# Patient Record
Sex: FEMALE | Race: WHITE | Employment: UNEMPLOYED | URBAN - METROPOLITAN AREA
[De-identification: names, ages, dates, MRNs, and addresses within clinical notes are randomized per-mention and may not be internally consistent; named-entity substitution may affect disease eponyms.]

---
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
## 2019-03-26 NOTE — PROGRESS NOTES
Consultation for hypertension and hypokalemia  Primary Care Physician: Donaldo Hines  Referring Provider: Referred Self    History of Present Illness:  The patient is an 17-year-old  female with reported history of atopic dermatitis, asthma, and recent history of rash, hypertension, hypokalemia, elevated creatinine, elevated aldosterone that presents to nephrology clinic for evaluation.      Her evaluation started November 20, 2017 when she presented to urgent care for a rash.  The rash was located on her upper chest for 3 days.  She she does not recall any new exposures including soaps, detergents, or clothing.  The rash did not improve with Benadryl.  The rash was not itchy or painful.  No fevers, chills, weight loss.  No joint pain.  At that visit her blood pressure was elevated to 152/97.  The rash was described as an erythematous papular to pustular lesion scattered across upper back, mild and less so in front of chest within cleavage area.  The patient was diagnosed with folliculitis and started on mupirocin ointment.     The patient went to see an oral surgeon on 12/6/2017 and was again found to have a high blood pressure up to 150 at that time.  The patient saw her primary care physician Dr. Hines that same day. She was also advised to stop birth control due to the hypertension.  She was taking Norgestimate-Eth Estradiol 0.18/0.215/0.25 mg-25 mcg daily for the past 18-20 months.  Routine lab work including CBC, urinalysis, complete metabolic profile, lipid profile, TSH were sent the complete metabolic profile was notable for a potassium of 3.3 and a bicarbonate of 36 and aldosterone level was sent at this time and was undetectable.  In the clinic the patient did have a blood pressure elevated to 155/115.  The other labs can be found in my progress note below.    The patient continued to measure her blood pressure at home which was elevated to 170/104 and presented to the emergency department for  further evaluation on 12 8.  The blood pressure was elevated in the ER to 156/102.  The patient was started on amlodipine 2.5 mg daily she had an EKG with an interpretation of normal.  3 days after the patient called the ER with ongoing blood pressure and the amlodipine was increased to 5 mg twice daily.  She was asked to follow-up in clinic.    The patient was then seen on 12/19/2017 with cardiology.  With the addition of amlodipine her blood pressure was in the 130-140s at home.  The blood pressure medication was continued and the patient was scheduled to get an echocardiogram in addition to her orderd pressure at home which was elevated to 170/104 and presented to the emergency department for further evaluation on 12 8.  The blood pressure was elevated in the ER to 156/102.  The patient was started on amlodipine 2.5 mg daily she had an EKG with an interpretation of normal.  3 days after the patient called the ER with ongoing blood pressure and the amlodipine was increased to 5 mg twice daily.  She was asked to follow-up in clinic.    The patient was then seen on 12/19/2017 with cardiology.  With the addition of amlodipine her blood pressure was in the 130-140s at home.    On 12/22/2017 she had a 24-hour urine collection as per nephrology which was significant only for an elevated aldosterone level at 31 mcg/24 hr.    The patient saw nephrology on 12/26/2017.  At the visit her blood pressure was 121/84.  Urine macro exam was notable for no blood . A direct microscopic exam of urine was performed with occasional renal tubular cells.  Follow-up in 1 week was done to evaluate 24 hour urine studies.  In the interim the patient did have her renal artery ultrasound and echocardiogram.  The renal artery ultrasound was notable for 3 arteries on the right and 2 arteries on the left with some loss of cortical medullary differentiation on the left.  There was no evidence for elevated velocities in any of the renal arteries  and the echocardiogram was normal.    The patient followed up with nephrology on 1/4/2018.  The blood pressure was 132/90 at the time.  A spot reading and was elevated and the high 24 hour urine Iglesia was also elevated.  Nephrology was concerned for fibromuscular dysplasia and a CTA was performed.  A renal angiogram was also discussed.    The patient has routine physicals as part of her annual exam or playing soccer.  Her last annual exam was 14 months prior to the evaluation of her hypertension.  She denies prior history of hypertension.  She does note that she gets occasional vague headaches.  She has a family history for migraine headaches.  The patient denies edema, NSAID use.  No flushing or palpitations.  She has regular periods.  She denies vision changes.    The patient is a senior in high school and wishes to pursue nursing.  She will be going to [employer] next year.  She is an avid .  She is accompanied in the clinic with her parents and friends.  Her mother is in school to become a nurse.    Since her last visit serologic testing for was positive with antimyeloperoxidase antibody.  Further inflammatory testing was negative.  She has no further issues or rash or new complaints.  Her blood pressure has been well controlled predominantly in the 120s systolic.  She did have one reading up to 148.  She is taking amlodipine 10 mg daily.  She did see IR and at that time her blood pressure was well controlled.  They did not feel that angiogram would be need to a lesion that was interveanable.      Active Medical Problems:  Patient Active Problem List   Diagnosis     Asthma     Atopic dermatitis     Hypokalemia     Hypertension       Past Surgical History:  Past Surgical History:   Procedure Laterality Date     HC REMOVE TONSILS/ADENOIDS,<10 Y/O  05/04       Family History:  Family History   Problem Relation Age of Onset     Asthma Mother      Hypertension Father      Lipids Father       Neurologic Disorder Maternal Grandmother      migraine headaches     Obesity Maternal Grandmother      gastric bypass surgery     CEREBROVASCULAR DISEASE Maternal Grandfather 32     Lipids Maternal Grandfather      Hypertension Maternal Grandfather      Congenital Anomalies Paternal Grandmother      Lupus     Hypertension Paternal Grandmother      Lipids Paternal Grandmother      HEART DISEASE Paternal Grandmother      CEREBROVASCULAR DISEASE Paternal Grandfather      Hypertension Paternal Grandfather      Lipids Paternal Grandfather      HEART DISEASE Paternal Grandfather      Aneurysm Maternal Aunt      Brain aneurysm       Social History:  Social History     Social History     Marital status: Single     Spouse name: N/A     Number of children: N/A     Years of education: N/A     Occupational History     Student      Social History Main Topics     Smoking status: Never Smoker     Smokeless tobacco: Never Used     Alcohol use No     Drug use: No     Sexual activity: No     Other Topics Concern     Not on file     Social History Narrative    The patient is a senior in high school.  She plays soccer and track.  She has smoked marijuana a couple of times, alcohol sometimes (4-5 times per year), 1 blackout.  No other drugs.  She is a vegetarian (she eats eggs but no milk or cheese).       Allergies:  Allergies   Allergen Reactions     Bee Venom Anaphylaxis     Cephalosporins      Cefzil       Medications:  Outpatient Encounter Prescriptions as of 3/9/2018   Medication Sig Dispense Refill     FERROUS GLUCONATE PO Take 27 mg by mouth       AmLODIPine Besylate (NORVASC PO) Take 5 mg by mouth 2 times daily       VITAMIN D, CHOLECALCIFEROL, PO Take 2,000 Units by mouth daily       calcium citrate-vitamin D (CITRACAL) 315-200 MG-UNIT TABS per tablet Take 1 tablet by mouth daily 630 mg daily 500 IU Vit D       multivitamin, therapeutic with minerals (MULTI-VITAMIN) TABS tablet Take 1 tablet by mouth daily       No  "facility-administered encounter medications on file as of 3/9/2018.         Review of Systems:   A comprehensive review of systems was obtained and negative, except as noted in the HPI or PMH.    Physical Exam:  /71  Pulse (P) 84  Temp (P) 98.2  F (36.8  C) (Oral)  Ht 1.654 m (5' 5.12\")  Wt 65.9 kg (145 lb 3.2 oz)  LMP 02/23/2018  SpO2 (P) 99%  BMI 24.07 kg/m2    GENERAL APPEARANCE: alert and no distress  HENT: mouth without ulcers or lesions  LYMPHATICS: no cervical or supraclavicular nodes  RESP: lungs clear to auscultation - no rales, rhonchi or wheezes  CV: regular rhythm, normal rate, no rub, no murmur  EDEMA: no LE edema bilaterally  ABDOMEN: soft, nondistended, nontender, bowel sounds normal, No bruit present.  MS: extremities normal - no gross deformities noted, no evidence of inflammation in joints, no muscle tenderness  SKIN: no rash    Laboratory:  12/6/2017  The white blood cell count was 6.1, hemoglobin 13.7, hematocrit 40.9, MCV 87.4, platelets 319, granulocytes 4.0, lymphocytes 1.7, the total monocyte eosinophil and basophil count was 0.4.  The urinalysis had a specific gravity of 1.010, pH 5.5, negative glucose, negative blood, negative protein, negative ketones, negative leuk esterase.  Sodium 138, potassium 3.3, chloride 101, bicarbonate 30, BUN 14, creatinine 0.68, albumin 3.5, total protein 7.5, bilirubin 0.6, AST 13, ALT 21, alk phos 71, cholesterol 186, HDL 64, LDL 95, triglycerides 137, TSH 1.72, beta hCG negative, aldosterone less than 4.    12/22/2017  24 hour urine collection, total volume 1250 mL, urine sodium 99, cortisol 20mcg, metanephrine 109, normetanephrine 386, total metanephrine 495, aldosterone 31.    12/26/2017  Urine pH 6.0, negative glucose, negative blood, negative bilirubin, negative ketones, negative leuk esterase, negative culture.    1/4/2017  Sodium 137, potassium 3.4, chloride 103, bicarb 26, BUN 9, creatinine 0.56, phosphorus 3.1, albumin 3.6, magnesium " 2.3, renin 9.2, aldosterone 14      Recent Labs   Lab Test  03/09/18   1133  02/02/18   1058   NA  137  141   POTASSIUM  3.2*  4.1   CHLORIDE  104  106   CO2  24  28   BUN  6*  8   CR  0.52  0.65   JOSIAH  8.9*  8.4*   PHOS  3.1  2.8   ALBUMIN  4.0  4.0   GLC  106*  89   WBC  8.5  4.5   HGB  13.9  14.3   MCV  88  88   PLT  216  218   PTHI   --   46   IRON   --   75   FEB   --   354   IRONSAT   --   21   AYANNA   --   26       Recent Labs   Lab Test  02/02/18   1105   COLOR  Yellow   APPEARANCE  Slightly Cloudy   URINEGLC  Negative   URINEBILI  Negative   URINEKETONE  Negative   SG  1.017   UBLD  Small*   URINEPH  9.0*   PROTEIN  30*   NITRITE  Negative   LEUKEST  Negative   RBCU  38*   WBCU  1   UTPG  0.11     ANCA antibody positive 1:320 (atypical p-ANCA).    2/12/2018  ALY screen - 42.49 units (<20)  Renin 9.6 ng/mL/h  Aldosterone serum, 11  Myeloperoxidase antibody 0.6 units   Proteinase 3 antibody <0.2 units  CRP < 2.5  CBC with differential - eos 0.1    Imaging:  All imaging studies or reports reviewed by me.     12/27/2017  Renal ultrasound with duplex  There are at least 3 right and 2 left renal arteries, velocities throughout all of which were expertly recorded by the ultrasound technologist and are recorded on her worksheet.  I agree with these velocities.  Notably, all are within normal limits.  No elevation of the renal arteries to aorta ratio involving any of the 5 arteries at any segment.  Additionally, the segmental artery acceleration indices were normal at all 3 poles bilaterally, as were the arcuate artery resistive indices at all 3 poles bilaterally.    The grayscale appearance of both kidneys is significance for some loss of corticomedullary differentiation somewhat worse on the right indicating some degree of chronic medical renal disease.  No hydronephrosis or mass lesion on either side.    Impression:  1.  There are 3 right and 2 left renal arteries were all patent throughout with no evidence for  renal artery stenosis involving any of the these vessels.  The acceleration indices, resistive indices, and spectral waveforms were also normal throughout.  No evidence for renal artery stenosis.  2.  There is some loss of corticomedullary differentiation, worse on the left, raising the possibility of underlying medical renal disease although this is a nonspecific and sometimes unreliable finding on ultrasound.    2/27/2017  Transthoracic echocardiogram  Final impression: The procedure performed was a complete 2D, spectral and CF Doppler echocardiogram.  The cardiac rhythm is sinus.  The left ventricle is normal in size.  There is no left ventricular wall thickness.  Left ventricular global, systolic function is normal.  Ejection fraction is greater than 55%.  No regional wall motion abnormalities noted.  There is mild mitral regurgitation.  The right atrial pressure is within normal range of less than 5 mm per mercury.  Diastolically appears to be normal.  This is essentially an unremarkable resting transthoracic echo.    1/11/2018  CTA abdomen and pelvis  Findings: Both kidneys are similar in size.  They enhance symmetrically.  There are 3 right and 2 left renal arteries.  All of these are widely patent.  No obvious fibromuscular dysplasia is present although its existence in 1 of the smaller accessory arteries would certainly be below the limits of resolution by CT.  The aorta and iliacs are widely patent.  The celiac axis, SMA and XIOMARA are all widely patent.  Lung bases clear.  Heart size is normal.  The liver, spleen, pancreas, and adrenal glands are within normal limits.  No visceral aneurysm is present.  No bowel dilatation to suggest ileus or obstruction.  No mass lesion or lymphadenopathy within the abdominal cavity or pelvis.  Osseous structures are negative for suspicious lesion.    Impression: Normal exam aside from normal variant multiple bilateral renal arteries are described.  No obvious changes of FMD  although the smaller accessory renal artery would be below limits of resolution for its detection.    -----------------------------------------------------------------------------------------------------------------------------------  Assessment and Plan:  Ms. Maguire is a 17 year old female with history of asthma and atopic dermatitis and poorly characterized headaches that presents with sudden onset of hypertension, hypokalemia, and rash, no history of chronic htn on imaging.  Her blood pressure is well controlled on amlodipine.  She presents with outside labs showing elevated renin, elevated 24 hour aldosterone, normal CTA of renal arteries but with difficulty obtaining adequate resolution due to the branching of her renal vasculature.  Work up here is significant for positive ANCA (positive MPO antibody).  Further inflammatory testing is negative.    1. Hypertension  The patient has concerning features for secondary hypertension including young age of onset and the sudden presentation.  Most likely cause of hypertension is oral contraceptive use.  Since she has stopped oral contraceptives and started amlodipine her blood pressure is very well controlled, goal less than 120/80.  She unlikely has a vasculitis as she is improved without any therapy.  Despite no evidence of narrowing on CT angiogram she is still suspicious to have renal artery narrowing with her high renin, aldosterone, and hypokalemia today.  She was evaluated by IR an angiogram was deferred as her blood pressure is well controlled on a single agent that is not on ACE inhibitor, ARB, or diuretic.  I recommend that she wean the amlodipine from 10 mg a day to 5 mg a day and monitor the blood pressure closely for at least 2 weeks.  If blood pressure remains well controlled she can further titrate the amlodipine medication.  If she did develop worsening hypertension  I would resend a renal panel, renin, and aldosterone levels.  I think an MRA of the  renal arteries may be of use with the ongoing biochemical abnormalities to further test the abhorrent anatomy.  -- Check MRA abdomen to evaluate for renal artery stenosis / fibromuscular dysplasia  -- Wean blood pressure (amlodipine) and start ACE-I if hypertension develops    2. Elevated Renin  The levels are only elevated after amlodipine was initiated.  Renin may be elevated with amlodipine use and worsened patient was volume depleted.  She reports good hydration but had a urine volume of 1.6 L in a 24-hour collection.  1.6 L uop is normal but certainly not high.  Her renin today is elevated to 14 which is higher than anticipated from amlodipine use.    3. Positive ANCA (with perinuclear atypical staining pattern)  4. Positive MPO antibody  5. Positive ALY  She is no evidence of an active vasculitis with normal inflammatory markers and her clinical history and exam.  The positive antibodies are in the equivocal range and may be representing a false positive test.  Recommend clinically watching her for any new symptoms.  Clinical course of improvement without treatment would not be characteristic for Churg-Sameer, lupus, GPA or MPA.    6. Hypokalemia  The concern is that the low serum potassium is due to renal losses due to a secondary hyperaldosterone state.  This is evident from the labs today.  Further, the patient is vegetarian and is likely exposed to relatively higher potassium in her diet.  Her current potassium level is low.  I will wean the patient from amlodipine which is likely driving the renin secretion higher.  If the patient develops hypertension would favor ACEI or ARB for potassium sparing properties.    7. Renal Function  There is note of some loss of corticomedullary junction on her renal ultrasound.  Her CT scan did not note issues.  She has normal renal function.    8. Recurrent Headache  She notes occasional headaches which have improved from the last visit.  I do not think she has gotten a  Detail Level: Detailed carotid ultrasound as I recommended her last visit.  Her headaches may have been from the high blood pressure which is well controlled.  If her headaches worsen or if there are abnormalities on the MRA then would favor carotid ultrasound.    9. Acid Base  The bicarbonate is normal today.  This may be elevated in a secondary hyperaldosterone state.  Will continue to monitor.    10. Iron status  She has borderline low iron stores.  She should continue to take her multivitamin and possibly one with iron.  She may have relatively low iron stores due to her diet and her menses.    11. Vitamin D deficiency  Her 25-OH vitamin D3 stores are low.  She is on cholecalciferol supplementation 2000 iu daily.  Repeat vitamin D level is improving as is her serum calcium.    Will determine follow up based off her repeat blood pressure readings.   Comment: 0.6

## 2019-11-03 ENCOUNTER — HEALTH MAINTENANCE LETTER (OUTPATIENT)
Age: 19
End: 2019-11-03

## 2020-11-16 ENCOUNTER — HEALTH MAINTENANCE LETTER (OUTPATIENT)
Age: 20
End: 2020-11-16

## 2021-09-18 ENCOUNTER — HEALTH MAINTENANCE LETTER (OUTPATIENT)
Age: 21
End: 2021-09-18

## 2022-01-08 ENCOUNTER — HEALTH MAINTENANCE LETTER (OUTPATIENT)
Age: 22
End: 2022-01-08

## 2022-11-20 ENCOUNTER — HEALTH MAINTENANCE LETTER (OUTPATIENT)
Age: 22
End: 2022-11-20

## 2023-04-15 ENCOUNTER — HEALTH MAINTENANCE LETTER (OUTPATIENT)
Age: 23
End: 2023-04-15